# Patient Record
Sex: MALE | Race: WHITE | NOT HISPANIC OR LATINO | Employment: FULL TIME | URBAN - METROPOLITAN AREA
[De-identification: names, ages, dates, MRNs, and addresses within clinical notes are randomized per-mention and may not be internally consistent; named-entity substitution may affect disease eponyms.]

---

## 2020-04-06 ENCOUNTER — NURSE TRIAGE (OUTPATIENT)
Dept: CALL CENTER | Facility: HOSPITAL | Age: 35
End: 2020-04-06

## 2020-04-07 NOTE — TELEPHONE ENCOUNTER
"  Pt stated he has to go through his depatment at work, stated he will call health depaRTMENT [facility]. Gave isolation rules, pt has young children at home.   Reason for Disposition  • [1] Fever (or feeling feverish) OR cough AND [2] within 14 Days of COVID-19 EXPOSURE (Close Contact)    Additional Information  • Negative: SEVERE difficulty breathing (e.g., struggling for each breath, speak in single words, bluish lips)  • Negative: Sounds like a life-threatening emergency to the triager  • Negative: [1] Adult has symptoms of COVID-19 (fever, cough, or SOB) AND [2] lab test positive  • Negative: [1] Adult has symptoms of COVID-19 (fever, cough or SOB) AND [2] major community spread where patient lives AND [3] testing not being done for mild symptoms  • Negative: [1] Difficulty breathing (shortness of breath) occurs AND [2] onset > 14 days after COVID-19 EXPOSURE (Close Contact) AND [3] no major community spread  • Negative: [1] Dry cough occurs AND [2] onset > 14 days after COVID-19 EXPOSURE AND [3] no major community spread  • Negative: [1] Wet cough (i.e., white-yellow, yellow, green, or doroteo colored sputum) AND [2] onset > 14 days after COVID-19 EXPOSURE AND [3] no major community spread  • Negative: [1] Common cold symptoms AND [2] onset > 14 days after COVID-19 EXPOSURE AND [3] no major community spread  • Negative: [1] Difficulty breathing occurs AND [2] within 14 days of COVID-19 EXPOSURE (Close Contact)  • Negative: Patient sounds very sick or weak to the triager    Answer Assessment - Initial Assessment Questions  1. CLOSE CONTACT: \"Who is the person with the confirmed or suspected COVID-19 infection that you were exposed to?\"      One of co workers tested positive, has notn been around him in 9 days. Also he is with the [employer]  2. PLACE of CONTACT: \"Where were you when you were exposed to COVID-19?\" (e.g., home, school, medical waiting room; which city?)       work  3. TYPE of " "CONTACT: \"How much contact was there?\" (e.g., sitting next to, live in same house, work in same office, same building)      work  4. DURATION of CONTACT: \"How long were you in contact with the COVID-19 patient?\" (e.g., a few seconds, passed by person, a few minutes, live with the patient)    unknown  5. DATE of CONTACT: \"When did you have contact with a COVID-19 patient?\" (e.g., how many days ago)      * unknown   6. TRAVEL: \"Have you traveled out of the country recently?\" If so, \"When and where?\"      * Also ask about out-of-state travel, since the [facility] has identified some high risk cities for community spread in the .      * Note: Travel becomes less relevant if there is widespread community transmission where the patient lives.      no  7. COMMUNITY SPREAD: \"Are there lots of cases or COVID-19 (community spread) where you live?\" (See public health department website, if unsure)    * MAJOR community spread: high number of cases; numbers of cases are increasing; many people hospitalized.    * MINOR community spread: low number of cases; not increasing; few or no people hospitalized      Minor but work is high risk  8. SYMPTOMS: \"Do you have any symptoms?\" (e.g., fever, cough, breathing difficulty)       Fever today of 101.2, sob but has mallergies and asthma9. PREGNANCY OR POSTPARTUM: \"Is there any chance you are pregnant?\" \"When was your last menstrual period?\" \"Did you deliver in the last 2 weeks?\"      no  10. HIGH RISK: \"Do you have any heart or lung problems? Do you have a weak immune system?\" (e.g., CHF, COPD, asthma, HIV positive, chemotherapy, renal failure, diabetes mellitus, sickle cell anemia)       asthma    Protocols used: CORONAVIRUS (COVID-19) EXPOSURE-ADULT-AH      "

## 2021-07-03 ENCOUNTER — HOSPITAL ENCOUNTER (EMERGENCY)
Facility: HOSPITAL | Age: 36
Discharge: HOME | End: 2021-07-03
Attending: EMERGENCY MEDICINE | Admitting: EMERGENCY MEDICINE

## 2021-07-03 VITALS
OXYGEN SATURATION: 97 % | RESPIRATION RATE: 16 BRPM | HEIGHT: 75 IN | DIASTOLIC BLOOD PRESSURE: 89 MMHG | TEMPERATURE: 98.7 F | SYSTOLIC BLOOD PRESSURE: 140 MMHG | HEART RATE: 69 BPM | BODY MASS INDEX: 26.11 KG/M2 | WEIGHT: 210 LBS

## 2021-07-03 DIAGNOSIS — S61.215A LACERATION OF LEFT RING FINGER WITHOUT FOREIGN BODY WITHOUT DAMAGE TO NAIL, INITIAL ENCOUNTER: Primary | ICD-10-CM

## 2021-07-03 PROCEDURE — 12001 RPR S/N/AX/GEN/TRNK 2.5CM/<: CPT | Performed by: EMERGENCY MEDICINE

## 2021-07-03 PROCEDURE — 99282 EMERGENCY DEPT VISIT SF MDM: CPT

## 2021-07-03 NOTE — DISCHARGE INSTRUCTIONS
Keep wound clean and watch for any signs of infection such as redness or swelling or increasing pain.  May return to the ER for any worsening symptoms or concerns.

## 2021-07-03 NOTE — ED PROVIDER NOTES
Subjective   History of Present Illness  History of Present Illness    Chief complaint: Laceration    Location: Left ring finger tip    Quality/Severity: Mild pain and bleeding    Timing/Duration: Occurred earlier this evening    Modifying Factors: None    Narrative: This patient presents for evaluation of a laceration to the tip of his left finger.  He was at home doing some yard work with some yard sangeetha when the blade accidentally lacerated the very tip of his left ring finger causing some bleeding and tenderness.  He says it feels numb and somewhat painful.  He irrigated and sterilized the wound earlier this evening at home with some peroxide.  Then he dressed it with some bandage before going into work.    Associated Symptoms: As above    Review of Systems   Skin: Positive for wound.   All other systems reviewed and are negative.      Past Medical History:   Diagnosis Date   • Asthma        No Known Allergies    History reviewed. No pertinent surgical history.    History reviewed. No pertinent family history.    Social History     Socioeconomic History   • Marital status: Single     Spouse name: Not on file   • Number of children: Not on file   • Years of education: Not on file   • Highest education level: Not on file   Tobacco Use   • Smoking status: Never Smoker   • Smokeless tobacco: Never Used   Substance and Sexual Activity   • Alcohol use: Yes     Alcohol/week: 2.0 standard drinks     Types: 2 Standard drinks or equivalent per week     Comment: OCC   • Drug use: Never     ED Triage Vitals   Temp Heart Rate Resp BP SpO2   07/03/21 0402 07/03/21 0401 07/03/21 0401 07/03/21 0401 07/03/21 0401   98.7 °F (37.1 °C) 69 16 140/89 97 %      Temp src Heart Rate Source Patient Position BP Location FiO2 (%)   07/03/21 0402 07/03/21 0401 07/03/21 0401 07/03/21 0401 --   Oral Monitor Sitting Right arm      Objective   Physical Exam  Vitals and nursing note reviewed.   Constitutional:       Appearance: He is  well-developed.   HENT:      Head: Normocephalic and atraumatic.   Eyes:      General:         Right eye: No discharge.         Left eye: No discharge.      Pupils: Pupils are equal, round, and reactive to light.   Cardiovascular:      Rate and Rhythm: Normal rate and regular rhythm.   Pulmonary:      Effort: Pulmonary effort is normal. No respiratory distress.   Musculoskeletal:         General: Tenderness and signs of injury present. No deformity. Normal range of motion.      Cervical back: Normal range of motion and neck supple.      Comments: The tip of the left ring finger demonstrates 2 small linear lacerations which are rather superficial.  There is some fresh blood but no significant active bleeding.  There is no foreign body present.  Patient reports subjective numbness at the tip of this finger.  The nail was preserved.   Skin:     General: Skin is warm and dry.      Findings: No erythema or rash.   Neurological:      Mental Status: He is alert and oriented to person, place, and time.   Psychiatric:         Behavior: Behavior normal.         Thought Content: Thought content normal.         Judgment: Judgment normal.         Laceration Repair    Date/Time: 7/3/2021 4:32 AM  Performed by: Jayme Evans MD  Authorized by: Jayme Evans MD     Consent:     Consent obtained:  Verbal    Consent given by:  Patient    Risks discussed:  Infection and pain  Anesthesia (see MAR for exact dosages):     Anesthesia method:  None  Laceration details:     Location:  Finger    Finger location:  L ring finger  Repair type:     Repair type:  Simple  Exploration:     Hemostasis achieved with:  Direct pressure    Wound exploration: entire depth of wound probed and visualized      Wound extent: no foreign bodies/material noted, no tendon damage noted, no underlying fracture noted and no vascular damage noted      Contaminated: no    Skin repair:     Repair method:  Tissue adhesive  Approximation:     Approximation:   "Close  Post-procedure details:     Dressing:  Open (no dressing)    Patient tolerance of procedure:  Tolerated well, no immediate complications               ED Course  ED Course as of Jul 03 0435   Sat Jul 03, 2021 0434 Patient presented for evaluation of laceration injury to the left ring finger.  It was rather superficial wound with controlled bleeding.  I applied some Dermabond to the wound which was able to reapproximate the wound margins pretty well.  Patient discharged home in good condition with the usual \"return to ER\" instructions for any worsening signs or symptoms.    [GABINO]      ED Course User Index  [GABINO] Jayme Evans MD                                           [facility]    Final diagnoses:   Laceration of left ring finger without foreign body without damage to nail, initial encounter       ED Disposition  ED Disposition     ED Disposition Condition Comment    Discharge Stable           Luigi Go MD  [address] [address]  [phone]    Schedule an appointment as soon as possible for a visit in 1 week  For wound re-check, As needed         Medication List      Stop    naproxen sodium 550 MG tablet  Commonly known as: ANAPROX             Jayme Evans MD  07/03/21 7855    "

## 2022-03-11 ENCOUNTER — TELEPHONE (OUTPATIENT)
Dept: INTERNAL MEDICINE | Facility: CLINIC | Age: 37
End: 2022-03-11

## 2022-03-11 NOTE — TELEPHONE ENCOUNTER
PATIENT CALLED TO RESCHEDULE APPOINTMENT FOR 03/11/22 DUE TO HAVING VEHICLE ISSUES. PATIENT SAID THAT HIS CAR WILL NOT START AND HE HAS NO OTHER MEANS TO COME INTO THE OFFICE AT THIS TIME.    HUB STAFF UNABLE TO RESCHEDULE DUE TO DR. BAXTER NOT HAVING ANY NEW PATIENT APPOINTMENTS ON THE TEMPLATE.    OFFICE STAFF INSTRUCTED TO SEND TELEPHONE ENCOUNTER BACK FOR DOCUMENTATION.    PLEASE ADVISE  [phone]